# Patient Record
Sex: FEMALE | Race: WHITE | Employment: OTHER | URBAN - NONMETROPOLITAN AREA
[De-identification: names, ages, dates, MRNs, and addresses within clinical notes are randomized per-mention and may not be internally consistent; named-entity substitution may affect disease eponyms.]

---

## 2022-03-07 ENCOUNTER — OFFICE VISIT (OUTPATIENT)
Dept: OTOLARYNGOLOGY | Age: 87
End: 2022-03-07
Payer: MEDICARE

## 2022-03-07 VITALS
HEART RATE: 68 BPM | HEIGHT: 64 IN | WEIGHT: 136.2 LBS | SYSTOLIC BLOOD PRESSURE: 118 MMHG | OXYGEN SATURATION: 99 % | BODY MASS INDEX: 23.25 KG/M2 | DIASTOLIC BLOOD PRESSURE: 76 MMHG | RESPIRATION RATE: 16 BRPM

## 2022-03-07 DIAGNOSIS — R04.0 EPISTAXIS: Primary | ICD-10-CM

## 2022-03-07 PROCEDURE — 1090F PRES/ABSN URINE INCON ASSESS: CPT | Performed by: OTOLARYNGOLOGY

## 2022-03-07 PROCEDURE — 99214 OFFICE O/P EST MOD 30 MIN: CPT | Performed by: OTOLARYNGOLOGY

## 2022-03-07 PROCEDURE — 1123F ACP DISCUSS/DSCN MKR DOCD: CPT | Performed by: OTOLARYNGOLOGY

## 2022-03-07 PROCEDURE — G8484 FLU IMMUNIZE NO ADMIN: HCPCS | Performed by: OTOLARYNGOLOGY

## 2022-03-07 PROCEDURE — G8427 DOCREV CUR MEDS BY ELIG CLIN: HCPCS | Performed by: OTOLARYNGOLOGY

## 2022-03-07 PROCEDURE — G8420 CALC BMI NORM PARAMETERS: HCPCS | Performed by: OTOLARYNGOLOGY

## 2022-03-07 PROCEDURE — 1036F TOBACCO NON-USER: CPT | Performed by: OTOLARYNGOLOGY

## 2022-03-07 PROCEDURE — 99203 OFFICE O/P NEW LOW 30 MIN: CPT | Performed by: OTOLARYNGOLOGY

## 2022-03-07 PROCEDURE — 4040F PNEUMOC VAC/ADMIN/RCVD: CPT | Performed by: OTOLARYNGOLOGY

## 2022-03-07 RX ORDER — MECLIZINE HYDROCHLORIDE 25 MG/1
TABLET ORAL
COMMUNITY

## 2022-03-07 RX ORDER — CLOTRIMAZOLE AND BETAMETHASONE DIPROPIONATE 10; .64 MG/G; MG/G
CREAM TOPICAL
COMMUNITY

## 2022-03-07 RX ORDER — ASPIRIN 81 MG/1
TABLET ORAL
COMMUNITY

## 2022-03-07 RX ORDER — PRAVASTATIN SODIUM 40 MG
TABLET ORAL
COMMUNITY

## 2022-03-07 RX ORDER — BACLOFEN 10 MG/1
TABLET ORAL
COMMUNITY
Start: 2022-01-10

## 2022-03-07 RX ORDER — CLOPIDOGREL BISULFATE 75 MG/1
TABLET ORAL
COMMUNITY
Start: 2022-02-10

## 2022-03-07 RX ORDER — PANTOPRAZOLE SODIUM 40 MG/1
40 TABLET, DELAYED RELEASE ORAL DAILY
COMMUNITY

## 2022-03-07 RX ORDER — FLUOROMETHOLONE 0.1 %
SUSPENSION, DROPS(FINAL DOSAGE FORM)(ML) OPHTHALMIC (EYE)
COMMUNITY
Start: 2021-06-07

## 2022-03-07 RX ORDER — ALBUTEROL SULFATE 90 UG/1
AEROSOL, METERED RESPIRATORY (INHALATION)
COMMUNITY

## 2022-03-07 RX ORDER — FLUTICASONE PROPIONATE 250 UG/1
POWDER, METERED RESPIRATORY (INHALATION)
COMMUNITY

## 2022-03-07 RX ORDER — FAMOTIDINE 20 MG/1
TABLET, FILM COATED ORAL
COMMUNITY
Start: 2022-02-10

## 2022-03-07 RX ORDER — CELECOXIB 200 MG/1
CAPSULE ORAL
COMMUNITY

## 2022-03-07 RX ORDER — CLOBETASOL PROPIONATE 0.5 MG/G
CREAM TOPICAL
COMMUNITY
Start: 2022-02-07

## 2022-03-07 RX ORDER — ACETAMINOPHEN 500 MG
TABLET ORAL
COMMUNITY
Start: 2022-02-07

## 2022-03-07 NOTE — PROGRESS NOTES
4909 2:56 PM CINDY Freedman (:  1935) is a 80 y.o. female,New patient, here for evaluation of the following chief complaint(s):  Epistaxis (nw- remove rhinorocket from right)      ASSESSMENT/PLAN:  1. Epistaxis      1. Epistaxis    Discussed follow-up in a few weeks versus as needed. She has elected for as needed follow-up. Counseling for epistaxis prevention:  Ointment/emollient (Bacitracin or non-petroleum based ointment preferably) to the nostrils every pm   Nasal saline spray to the nose every am (2 sprays in both nostrils)  Cool humidifier in the bedroom   Drinks lots of water   Avoid trauma to the nose   Avoid nose blowing x 7 days   Avoid direct breeze of a fan onto the nose  No vigorous activity for 7 days that includes anything that gets the heart rate or blood pressure up. No bending over, straining, or lifting anything more than a few pounds for 7 days. Stop any medicine nasal sprays (Flonase, Nasonex, Nasacort, Rhinocort, Astelin, Atrovent)  If you get another nose bleed, spray afrin onto a cotton ball and put into the nostril as temporary packing. Do not use afrin for more than 3 days. No follow-ups on file. SUBJECTIVE/OBJECTIVE:  HPI   49-year-old woman who presented to the emergency room at [facility] on 2022. She complained of a nosebleed from the right side. She denied any inciting event such as trauma, scratching, or picking. She is on aspirin and Plavix. Her blood pressure was mildly elevated in triage but states her history is well controlled. 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 After 30 minutes, there was no recurrence of bleeding. Her hemoglobin and hematocrit were normal.  Her INR was 1.0. She was discharged home she returned to the emergency room on 2022 for another right-sided nosebleed that would not seem to stop with pressure. 7.5 cm Rhino Rocket was placed.   She presents today for removal.  Has not had any further epistaxis since placement of the [employer]. Her aspirin and Plavix are still currently on hold via the primary provider. History reviewed. No pertinent past medical history. History reviewed. No pertinent surgical history. Social History     Tobacco History     Smoking Status  Never Smoker    Smokeless Tobacco Use  Never Used          Alcohol History     Alcohol Use Status  Not Asked          Drug Use     Drug Use Status  Never          Sexual Activity     Sexually Active  Not Asked              Family History   Problem Relation Age of Onset    No Known Problems Mother     No Known Problems Father      Current Outpatient Medications   Medication Instructions    acetaminophen (TYLENOL) 500 MG tablet Acetaminophen (Tylenol Extra Strength) 500 mg tablet Active 500 MG PO Q6H February 7th, 2022 1:15pm    albuterol sulfate HFA (PROAIR HFA) 108 (90 Base) MCG/ACT inhaler ProAir HFA 90 mcg/actuation aerosol inhaler    aspirin 81 MG EC tablet aspirin 81 mg tablet,delayed release   Take 1 tablet every day by oral route.  baclofen (LIORESAL) 10 MG tablet No dose, route, or frequency recorded.  celecoxib (CELEBREX) 200 MG capsule celecoxib 200 mg capsule    clobetasol prop emollient base 0.05 % CREA Clobetasol Active 1 APPLICATIO TP DAILY 15 28 February 7th, 2022 1:53pm Apply pea sized amount topically before bedtime, daily for four weeks.  clopidogrel (PLAVIX) 75 MG tablet No dose, route, or frequency recorded.  clotrimazole-betamethasone (LOTRISONE) 1-0.05 % cream clotrimazole-betamethasone 1 %-0.05 % topical cream    famotidine (PEPCID) 20 MG tablet No dose, route, or frequency recorded.     fluorometholone (FML) 0.1 % ophthalmic suspension fluorometholone 0.1 % eye drops,suspension    fluticasone propionate (FLOVENT DISKUS) 250 MCG/BLIST AEPB inhaler Flovent Diskus 250 mcg/actuation powder for inhalation    meclizine (ANTIVERT) 25 MG tablet meclizine 25 mg tablet    pantoprazole (PROTONIX) 40 mg, Oral, DAILY    pravastatin (PRAVACHOL) 40 MG tablet pravastatin 40 mg tablet   Take 1 tablet every day by oral route. Allergies   Allergen Reactions    Nsaids Anaphylaxis and Other (See Comments)     zomepirac or Zomax - specifically  zomepirac or Zomax - specifically      Minocycline Dizziness or Vertigo     dizziness  dizziness      Azithromycin Nausea Only    Codeine Nausea Only and Nausea And Vomiting     Allegy-Codeine Derivatives       Fentanyl Nausea Only and Nausea And Vomiting       ENT ROS: positive for - epistaxis    General: The patient is found to be alert and normally responsive female. Hearing: grossly normal   Voice: Clear   Skin: The skin has normal colour and turgor. Face: The facial contour is symmetric at rest and with movement. Ears: The pinnae have normal contours. Eye: The ocular movements are full and symmetric, the conjunctiva is unremarkable; sclera are anicteric, pupillary response is symmetric. No nystagmus is found. Nose:   The external nasal contour is normal  The nasal mucosa has a normal appearance. The nasal septum is straight. The turbinates have a normal appearance  The nares are patent without evidence of polyposis   Rhino Rocket in place and secured in the right nasal cavity  Rhino Rocket deflated, no bleeding anteriorly or posteriorly  Rhino Rocket gently removed, no bleeding anterior posteriorly  Oral cavity:   The dentition is healthy. The oral mucosa is without lesions;  the tongue is symmetric with full mobility and is without fasciculation. The soft palate is symmetric. The oropharynx is unremarkable. No bleeding  Neck: The neck has a normal contour; no masses are found on palpation        An electronic signature was used to authenticate this note.     --Aysha Rothman MD     3/7/2022 3:17 PM EST

## 2022-03-11 ENCOUNTER — TELEPHONE (OUTPATIENT)
Dept: OTOLARYNGOLOGY | Age: 87
End: 2022-03-11

## 2022-03-11 NOTE — TELEPHONE ENCOUNTER
Appointment given for Tuesday 3-. I also gave her advice to use saline, a humidifier and keep afrin and cottonballs o hand in case she were to develop a significant nosebleed.

## 2022-03-11 NOTE — TELEPHONE ENCOUNTER
Patient was seen on 3/7/2022 and had a rhino rocket removed. She does not have a follow up but states this morning when she dabs her nose with a kleenex there is bright red blood. Patient is concerned because this is how her nose bleed started before and she does not want to go through having the rhino rocket again. Please call her back at [phone].

## 2022-03-15 ENCOUNTER — OFFICE VISIT (OUTPATIENT)
Dept: OTOLARYNGOLOGY | Age: 87
End: 2022-03-15
Payer: MEDICARE

## 2022-03-15 VITALS
RESPIRATION RATE: 18 BRPM | HEART RATE: 66 BPM | SYSTOLIC BLOOD PRESSURE: 122 MMHG | WEIGHT: 136.2 LBS | DIASTOLIC BLOOD PRESSURE: 80 MMHG | BODY MASS INDEX: 23.25 KG/M2 | HEIGHT: 64 IN

## 2022-03-15 DIAGNOSIS — J34.2 DEVIATED SEPTUM: ICD-10-CM

## 2022-03-15 DIAGNOSIS — R04.0 EPISTAXIS: ICD-10-CM

## 2022-03-15 DIAGNOSIS — J38.00 VOCAL CORD PARALYSIS: Primary | ICD-10-CM

## 2022-03-15 PROCEDURE — G8427 DOCREV CUR MEDS BY ELIG CLIN: HCPCS | Performed by: OTOLARYNGOLOGY

## 2022-03-15 PROCEDURE — G8484 FLU IMMUNIZE NO ADMIN: HCPCS | Performed by: OTOLARYNGOLOGY

## 2022-03-15 PROCEDURE — 1123F ACP DISCUSS/DSCN MKR DOCD: CPT | Performed by: OTOLARYNGOLOGY

## 2022-03-15 PROCEDURE — 99214 OFFICE O/P EST MOD 30 MIN: CPT | Performed by: OTOLARYNGOLOGY

## 2022-03-15 PROCEDURE — 99212 OFFICE O/P EST SF 10 MIN: CPT | Performed by: OTOLARYNGOLOGY

## 2022-03-15 PROCEDURE — G8420 CALC BMI NORM PARAMETERS: HCPCS | Performed by: OTOLARYNGOLOGY

## 2022-03-15 PROCEDURE — 1036F TOBACCO NON-USER: CPT | Performed by: OTOLARYNGOLOGY

## 2022-03-15 PROCEDURE — 1090F PRES/ABSN URINE INCON ASSESS: CPT | Performed by: OTOLARYNGOLOGY

## 2022-03-15 PROCEDURE — 31231 NASAL ENDOSCOPY DX: CPT | Performed by: OTOLARYNGOLOGY

## 2022-03-15 PROCEDURE — 31575 DIAGNOSTIC LARYNGOSCOPY: CPT | Performed by: OTOLARYNGOLOGY

## 2022-03-15 PROCEDURE — 4040F PNEUMOC VAC/ADMIN/RCVD: CPT | Performed by: OTOLARYNGOLOGY

## 2022-03-15 RX ORDER — ADHESIVE TAPE 3"X 2.3 YD
400 TAPE, NON-MEDICATED TOPICAL 2 TIMES DAILY
COMMUNITY

## 2022-03-15 NOTE — PROGRESS NOTES
2:01 PM CINDY Lucas (:  1935) is a 80 y.o. female,New patient, here for evaluation of the following chief complaint(s):  Epistaxis (right side has been oozing)      ASSESSMENT/PLAN:  1. Vocal cord paralysis    2. Deviated septum    3. Epistaxis      1. Vocal cord paralysis  2. Deviated septum  3. Epistaxis    Fu in 2 weeks     Counseling for epistaxis prevention:  Ointment/emollient (Bacitracin or non-petroleum based ointment preferably) to the nostrils every pm   Nasal saline spray to the nose every am (2 sprays in both nostrils)  Cool humidifier in the bedroom   Drinks lots of water   Avoid trauma to the nose   Avoid nose blowing x 7 days   Avoid direct breeze of a fan onto the nose  No vigorous activity for 7 days that includes anything that gets the heart rate or blood pressure up. No bending over, straining, or lifting anything more than a few pounds for 7 days. Stop any medicine nasal sprays (Flonase, Nasonex, Nasacort, Rhinocort, Astelin, Atrovent)  If you get another nose bleed, spray afrin onto a cotton ball and put into the nostril as temporary packing. Do not use afrin for more than 3 days. No follow-ups on file. SUBJECTIVE/OBJECTIVE:  HPI   80-year-old woman who presented to the emergency room at [facility] on 2022. She complained of a nosebleed from the right side. She denied any inciting event such as trauma, scratching, or picking. She is on aspirin and Plavix. Her blood pressure was mildly elevated in triage but states her history is well controlled. 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 After 30 minutes, there was no recurrence of bleeding. Her hemoglobin and hematocrit were normal.  Her INR was 1.0. She was discharged home she returned to the emergency room on 2022 for another right-sided nosebleed that would not seem to stop with pressure. 7.5 cm Rhino Rocket was placed.   She presents today for removal.  Has not had any further epistaxis since placement of the [employer]. Her aspirin and Plavix are still currently on hold via the primary provider. She follows up about 1 week later. Is having some anterior bleeding from the right nose. Not as bad as original epistaxis on 2/23. Recently saw vascular surgeon. She has stopped the plavix and is on ASA only right now. 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 Stable. Bothered by vocal changes. Denies aspiration. Has worked with speech therapy in the past without much subjective improvement. She asks if we can check her vocal cord. No past medical history on file. No past surgical history on file. Social History     Tobacco History     Smoking Status  Never Smoker    Smokeless Tobacco Use  Never Used          Alcohol History     Alcohol Use Status  Not Asked          Drug Use     Drug Use Status  Never          Sexual Activity     Sexually Active  Not Asked              Family History   Problem Relation Age of Onset    No Known Problems Mother     No Known Problems Father      Current Outpatient Medications   Medication Instructions    acetaminophen (TYLENOL) 500 MG tablet Acetaminophen (Tylenol Extra Strength) 500 mg tablet Active 500 MG PO Q6H February 7th, 2022 1:15pm    albuterol sulfate HFA (PROAIR HFA) 108 (90 Base) MCG/ACT inhaler ProAir HFA 90 mcg/actuation aerosol inhaler    aspirin 81 MG EC tablet aspirin 81 mg tablet,delayed release   Take 1 tablet every day by oral route.  baclofen (LIORESAL) 10 MG tablet No dose, route, or frequency recorded.  celecoxib (CELEBREX) 200 MG capsule celecoxib 200 mg capsule    clobetasol prop emollient base 0.05 % CREA Clobetasol Active 1 APPLICATIO TP DAILY 15 28 February 7th, 2022 1:53pm Apply pea sized amount topically before bedtime, daily for four weeks.  clopidogrel (PLAVIX) 75 MG tablet No dose, route, or frequency recorded.     clotrimazole-betamethasone (LOTRISONE) 1-0.05 % cream clotrimazole-betamethasone 1 %-0.05 % topical cream    famotidine (PEPCID) 20 MG tablet No dose, route, or frequency recorded.  fluorometholone (FML) 0.1 % ophthalmic suspension fluorometholone 0.1 % eye drops,suspension    fluticasone propionate (FLOVENT DISKUS) 250 MCG/BLIST AEPB inhaler Flovent Diskus 250 mcg/actuation powder for inhalation    Magnesium Oxide 400 mg, Oral, 2 TIMES DAILY    meclizine (ANTIVERT) 25 MG tablet meclizine 25 mg tablet    pantoprazole (PROTONIX) 40 mg, Oral, DAILY    pravastatin (PRAVACHOL) 40 MG tablet pravastatin 40 mg tablet   Take 1 tablet every day by oral route. Allergies   Allergen Reactions    Nsaids Anaphylaxis and Other (See Comments)     zomepirac or Zomax - specifically  zomepirac or Zomax - specifically      Minocycline Dizziness or Vertigo     dizziness  dizziness      Azithromycin Nausea Only    Codeine Nausea Only and Nausea And Vomiting     Allegy-Codeine Derivatives       Fentanyl Nausea Only and Nausea And Vomiting       ENT ROS: positive for - epistaxis    General: The patient is found to be alert and normally responsive female. Hearing: grossly normal   Voice: Clear   Skin: The skin has normal colour and turgor. Face: The facial contour is symmetric at rest and with movement. Ears: The pinnae have normal contours. Eye: The ocular movements are full and symmetric, the conjunctiva is unremarkable; sclera are anicteric, pupillary response is symmetric. No nystagmus is found. Nose:   The external nasal contour is normal  The nasal mucosa is excoriated at the right anterior septum, evidence of recent epistaxis    The nasal septum is deviated to the right. The turbinates have a normal appearance  The nares are patent without evidence of polyposis   Oral cavity:   The dentition is healthy. The oral mucosa is without lesions;  the tongue is symmetric with full mobility and is without fasciculation.   The soft palate is symmetric. The oropharynx is unremarkable. No bleeding  Neck: The neck has a normal contour; no masses are found on palpation    Fiberoptic examination of the upper airway using scope was conducted after first applying topical phenylephrine (1%) and lidocaine (4%) to the bilateral nasal cavity by spray. The endoscope was inserted and advanced through the bilateral side of the nose examining the mucosa and nasal structures. Right:  Inferior turbinate normal, middle meatus clear, no polyps or purulence  Left:  Inferior turbinate normal, middle meatus clear, no polyps or purulence  Nasopharynx clear, ET patent, adenoid small. Septum to the right. Fiberoptic examination of the upper airway using scope was conducted after first applying topical phenylephrine (1%) and lidocaine (4%) to the bilateral nasal cavity by spray. The endoscope was inserted and advanced through the bilateral side of the nose examining the mucosa and nasal structures. Advancement through the nasopharynx was continued into the hypopharynx and larynx. The tongue base, vallecula, pharyngeal walls, epiglottis aryepiglottic folds arytenoids, vocal cords, piriform sinuses and proximal trachea were examined. Findings include left medialized, immobile vocal cord, no pooled secretions. Mobility of the structures was symmetric and full. An electronic signature was used to authenticate this note.     --Abrahan English MD     3/15/2022 3:17 PM EST

## 2022-04-01 ENCOUNTER — OFFICE VISIT (OUTPATIENT)
Dept: OTOLARYNGOLOGY | Age: 87
End: 2022-04-01
Payer: MEDICARE

## 2022-04-01 VITALS
HEIGHT: 66 IN | BODY MASS INDEX: 21.89 KG/M2 | DIASTOLIC BLOOD PRESSURE: 78 MMHG | WEIGHT: 136.2 LBS | OXYGEN SATURATION: 97 % | HEART RATE: 77 BPM | RESPIRATION RATE: 16 BRPM | SYSTOLIC BLOOD PRESSURE: 114 MMHG

## 2022-04-01 DIAGNOSIS — R04.0 EPISTAXIS: ICD-10-CM

## 2022-04-01 DIAGNOSIS — J34.2 DEVIATED SEPTUM: Primary | ICD-10-CM

## 2022-04-01 PROCEDURE — 1090F PRES/ABSN URINE INCON ASSESS: CPT | Performed by: OTOLARYNGOLOGY

## 2022-04-01 PROCEDURE — G8420 CALC BMI NORM PARAMETERS: HCPCS | Performed by: OTOLARYNGOLOGY

## 2022-04-01 PROCEDURE — 30901 CONTROL OF NOSEBLEED: CPT | Performed by: OTOLARYNGOLOGY

## 2022-04-01 PROCEDURE — G8427 DOCREV CUR MEDS BY ELIG CLIN: HCPCS | Performed by: OTOLARYNGOLOGY

## 2022-04-01 PROCEDURE — 1123F ACP DISCUSS/DSCN MKR DOCD: CPT | Performed by: OTOLARYNGOLOGY

## 2022-04-01 PROCEDURE — 99214 OFFICE O/P EST MOD 30 MIN: CPT | Performed by: OTOLARYNGOLOGY

## 2022-04-01 PROCEDURE — 4040F PNEUMOC VAC/ADMIN/RCVD: CPT | Performed by: OTOLARYNGOLOGY

## 2022-04-01 PROCEDURE — 99213 OFFICE O/P EST LOW 20 MIN: CPT | Performed by: OTOLARYNGOLOGY

## 2022-04-01 PROCEDURE — 1036F TOBACCO NON-USER: CPT | Performed by: OTOLARYNGOLOGY

## 2022-04-01 NOTE — PROGRESS NOTES
2151 1:57 PM ICNDY Artis (:  1935) is a 80 y.o. female,New patient, here for evaluation of the following chief complaint(s):  Epistaxis (2 wk fucaut right)      ASSESSMENT/PLAN:  1. Deviated septum    2. Epistaxis      1. Deviated septum  2. Epistaxis    Fu in 2 weeks     Counseling for epistaxis prevention:  Ointment/emollient (Bacitracin or non-petroleum based ointment preferably) to the nostrils every pm   Nasal saline spray to the nose every am (2 sprays in both nostrils)  Cool humidifier in the bedroom   Drinks lots of water   Avoid trauma to the nose   Avoid nose blowing x 7 days   Avoid direct breeze of a fan onto the nose  No vigorous activity for 7 days that includes anything that gets the heart rate or blood pressure up. No bending over, straining, or lifting anything more than a few pounds for 7 days. Stop any medicine nasal sprays (Flonase, Nasonex, Nasacort, Rhinocort, Astelin, Atrovent)  If you get another nose bleed, spray afrin onto a cotton ball and put into the nostril as temporary packing. Do not use afrin for more than 3 days. No follow-ups on file. SUBJECTIVE/OBJECTIVE:  Epistaxis     77-year-old woman who presented to the emergency room at [facility] on 2022. She complained of a nosebleed from the right side. She denied any inciting event such as trauma, scratching, or picking. She is on aspirin and Plavix. Her blood pressure was mildly elevated in triage but states her history is well controlled. 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 After 30 minutes, there was no recurrence of bleeding. Her hemoglobin and hematocrit were normal.  Her INR was 1.0. She was discharged home she returned to the emergency room on 2022 for another right-sided nosebleed that would not seem to stop with pressure. 7.5 cm Rhino Rocket was placed.   She presented for removal.  Has not had any further epistaxis since placement of the Rhino idania. Her aspirin and Plavix are still currently on hold via the primary provider. She follows up about 1 week later. Is having some anterior bleeding from the right nose. Not as bad as original epistaxis on 2/23. Recently saw vascular surgeon. She has stopped the plavix and is on ASA only right now. We did cautery on the right septum 3/15/22. 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 Stable. Bothered by vocal changes. Denies aspiration. Has worked with speech therapy in the past without much subjective improvement. She asks if we can check her vocal cord. History reviewed. No pertinent past medical history. History reviewed. No pertinent surgical history. Social History     Tobacco History     Smoking Status  Never Smoker    Smokeless Tobacco Use  Never Used          Alcohol History     Alcohol Use Status  Not Asked          Drug Use     Drug Use Status  Never          Sexual Activity     Sexually Active  Not Asked              Family History   Problem Relation Age of Onset    No Known Problems Mother     No Known Problems Father      Current Outpatient Medications   Medication Instructions    acetaminophen (TYLENOL) 500 MG tablet Acetaminophen (Tylenol Extra Strength) 500 mg tablet Active 500 MG PO Q6H February 7th, 2022 1:15pm    albuterol sulfate HFA (PROAIR HFA) 108 (90 Base) MCG/ACT inhaler ProAir HFA 90 mcg/actuation aerosol inhaler    aspirin 81 MG EC tablet aspirin 81 mg tablet,delayed release   Take 1 tablet every day by oral route.  baclofen (LIORESAL) 10 MG tablet No dose, route, or frequency recorded.  celecoxib (CELEBREX) 200 MG capsule celecoxib 200 mg capsule    clobetasol prop emollient base 0.05 % CREA Clobetasol Active 1 APPLICATIO TP DAILY 15 28 February 7th, 2022 1:53pm Apply pea sized amount topically before bedtime, daily for four weeks.  clopidogrel (PLAVIX) 75 MG tablet No dose, route, or frequency recorded.     clotrimazole-betamethasone (LOTRISONE) 1-0.05 % cream clotrimazole-betamethasone 1 %-0.05 % topical cream    famotidine (PEPCID) 20 MG tablet No dose, route, or frequency recorded.  fluorometholone (FML) 0.1 % ophthalmic suspension fluorometholone 0.1 % eye drops,suspension    fluticasone propionate (FLOVENT DISKUS) 250 MCG/BLIST AEPB inhaler Flovent Diskus 250 mcg/actuation powder for inhalation    Magnesium Oxide 400 mg, Oral, 2 TIMES DAILY    meclizine (ANTIVERT) 25 MG tablet meclizine 25 mg tablet    pantoprazole (PROTONIX) 40 mg, Oral, DAILY    pravastatin (PRAVACHOL) 40 MG tablet pravastatin 40 mg tablet   Take 1 tablet every day by oral route. Allergies   Allergen Reactions    Nsaids Anaphylaxis and Other (See Comments)     zomepirac or Zomax - specifically  zomepirac or Zomax - specifically      Minocycline Dizziness or Vertigo     dizziness  dizziness      Azithromycin Nausea Only    Codeine Nausea Only and Nausea And Vomiting     Allegy-Codeine Derivatives       Fentanyl Nausea Only and Nausea And Vomiting       ENT ROS: positive for - epistaxis    General: The patient is found to be alert and normally responsive female. Hearing: grossly normal   Voice: Clear   Skin: The skin has normal colour and turgor. Face: The facial contour is symmetric at rest and with movement. Ears: The pinnae have normal contours. Eye: The ocular movements are full and symmetric, the conjunctiva is unremarkable; sclera are anicteric, pupillary response is symmetric. No nystagmus is found. Nose:   The external nasal contour is normal  The nasal mucosa is excoriated at the right anterior septum, evidence of recent epistaxis, area of scabbing treated with h202 and gently suctioned with fresh bleeding. Controlled with afrin pressure and treated with silver nitrate. The nasal septum is deviated to the right.     The turbinates have a normal appearance  The nares are patent without evidence of polyposis Oral cavity:   The dentition is healthy. The oral mucosa is without lesions;  the tongue is symmetric with full mobility and is without fasciculation. The soft palate is symmetric. The oropharynx is unremarkable. No bleeding  Neck: The neck has a normal contour; no masses are found on palpation    Previous:   Fiberoptic examination of the upper airway using scope was conducted after first applying topical phenylephrine (1%) and lidocaine (4%) to the bilateral nasal cavity by spray. The endoscope was inserted and advanced through the bilateral side of the nose examining the mucosa and nasal structures. Right:  Inferior turbinate normal, middle meatus clear, no polyps or purulence  Left:  Inferior turbinate normal, middle meatus clear, no polyps or purulence  Nasopharynx clear, ET patent, adenoid small. Septum to the right. Previous:   Fiberoptic examination of the upper airway using scope was conducted after first applying topical phenylephrine (1%) and lidocaine (4%) to the bilateral nasal cavity by spray. The endoscope was inserted and advanced through the bilateral side of the nose examining the mucosa and nasal structures. Advancement through the nasopharynx was continued into the hypopharynx and larynx. The tongue base, vallecula, pharyngeal walls, epiglottis aryepiglottic folds arytenoids, vocal cords, piriform sinuses and proximal trachea were examined. Findings include left medialized, immobile vocal cord, no pooled secretions. Mobility of the structures was symmetric and full. CONTROL OF EPISTAXIS  Afrin soaked cotton ball placed on the right anterior septum. Prominent vessel touched with silver nitrate on the right anterior septum. No further bleeding. Patient tolerated well. Ointment placed into right nasal cavity. An electronic signature was used to authenticate this note.     --Sangeeta Carlson MD     4/1/2022 3:17 PM EST

## 2022-04-15 ENCOUNTER — OFFICE VISIT (OUTPATIENT)
Dept: OTOLARYNGOLOGY | Age: 87
End: 2022-04-15
Payer: MEDICARE

## 2022-04-15 VITALS
DIASTOLIC BLOOD PRESSURE: 74 MMHG | OXYGEN SATURATION: 99 % | WEIGHT: 136.2 LBS | BODY MASS INDEX: 24.13 KG/M2 | SYSTOLIC BLOOD PRESSURE: 124 MMHG | RESPIRATION RATE: 18 BRPM | HEIGHT: 63 IN | HEART RATE: 80 BPM

## 2022-04-15 DIAGNOSIS — J34.2 DEVIATED SEPTUM: Primary | ICD-10-CM

## 2022-04-15 DIAGNOSIS — R04.0 EPISTAXIS: ICD-10-CM

## 2022-04-15 PROCEDURE — 30901 CONTROL OF NOSEBLEED: CPT | Performed by: OTOLARYNGOLOGY

## 2022-04-15 PROCEDURE — 1123F ACP DISCUSS/DSCN MKR DOCD: CPT | Performed by: OTOLARYNGOLOGY

## 2022-04-15 PROCEDURE — 4040F PNEUMOC VAC/ADMIN/RCVD: CPT | Performed by: OTOLARYNGOLOGY

## 2022-04-15 PROCEDURE — G8420 CALC BMI NORM PARAMETERS: HCPCS | Performed by: OTOLARYNGOLOGY

## 2022-04-15 PROCEDURE — G8427 DOCREV CUR MEDS BY ELIG CLIN: HCPCS | Performed by: OTOLARYNGOLOGY

## 2022-04-15 PROCEDURE — 1036F TOBACCO NON-USER: CPT | Performed by: OTOLARYNGOLOGY

## 2022-04-15 PROCEDURE — 99214 OFFICE O/P EST MOD 30 MIN: CPT | Performed by: OTOLARYNGOLOGY

## 2022-04-15 PROCEDURE — 1090F PRES/ABSN URINE INCON ASSESS: CPT | Performed by: OTOLARYNGOLOGY

## 2022-04-15 NOTE — PROGRESS NOTES
8844 9:62 PM CINDY Khanna (:  1935) is a 80 y.o. female,New patient, here for evaluation of the following chief complaint(s):  Epistaxis (2 wk fu caut right)      ASSESSMENT/PLAN:  1. Deviated septum    2. Epistaxis      1. Deviated septum  2. Epistaxis    Fu in 2 weeks   Avoid any powered or pressured saline sprays into the right nose. She is preventing the area from healing well by continually irritating the area with the powered sprayer. Recommend either a very gentle saline spray or nothing at all. Hyfrecator treatment to the right if she continues to have epistaxis. Uphill bledsoe with anticoagulation and severe deviated septum  Continue humidifier in the bedroom    No follow-ups on file. SUBJECTIVE/OBJECTIVE:  Epistaxis     19-year-old woman who presented to the emergency room at [facility] on 2022. She complained of a nosebleed from the right side. She denied any inciting event such as trauma, scratching, or picking. She is on aspirin and Plavix. Her blood pressure was mildly elevated in triage but states her history is well controlled. 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 After 30 minutes, there was no recurrence of bleeding. Her hemoglobin and hematocrit were normal.  Her INR was 1.0. She was discharged home she returned to the emergency room on 2022 for another right-sided nosebleed that would not seem to stop with pressure. 7.5 cm Rhino Rocket was placed. She presented for removal.  Has not had any further epistaxis since placement of the [employer]. Her aspirin and Plavix are still currently on hold via the primary provider. She follows up about 1 week later. Is having some anterior bleeding from the right nose. Not as bad as original epistaxis on . Recently saw vascular surgeon. She has stopped the plavix and is on ASA only right now. We did cautery on the right septum 3/15/22.   We did a second cautery on the right septum on 4/1/2022. She reports using a powered or aerosolized saline spray in the morning time and knocking off the scab. Following this the area seems to ooze. 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 Stable. Bothered by vocal changes. Denies aspiration. Has worked with speech therapy in the past without much subjective improvement. She asks if we can check her vocal cord. History reviewed. No pertinent past medical history. History reviewed. No pertinent surgical history. Social History     Tobacco History     Smoking Status  Never Smoker    Smokeless Tobacco Use  Never Used          Alcohol History     Alcohol Use Status  Not Asked          Drug Use     Drug Use Status  Never          Sexual Activity     Sexually Active  Not Asked              Family History   Problem Relation Age of Onset    No Known Problems Mother     No Known Problems Father      Current Outpatient Medications   Medication Instructions    acetaminophen (TYLENOL) 500 MG tablet Acetaminophen (Tylenol Extra Strength) 500 mg tablet Active 500 MG PO Q6H February 7th, 2022 1:15pm    albuterol sulfate HFA (PROAIR HFA) 108 (90 Base) MCG/ACT inhaler ProAir HFA 90 mcg/actuation aerosol inhaler    aspirin 81 MG EC tablet aspirin 81 mg tablet,delayed release   Take 1 tablet every day by oral route.  baclofen (LIORESAL) 10 MG tablet No dose, route, or frequency recorded.  celecoxib (CELEBREX) 200 MG capsule celecoxib 200 mg capsule    clobetasol prop emollient base 0.05 % CREA Clobetasol Active 1 APPLICATIO TP DAILY 15 28 February 7th, 2022 1:53pm Apply pea sized amount topically before bedtime, daily for four weeks.  clopidogrel (PLAVIX) 75 MG tablet No dose, route, or frequency recorded.  clotrimazole-betamethasone (LOTRISONE) 1-0.05 % cream clotrimazole-betamethasone 1 %-0.05 % topical cream    famotidine (PEPCID) 20 MG tablet No dose, route, or frequency recorded.     fluorometholone (FML) 0.1 % ophthalmic suspension fluorometholone 0.1 % eye drops,suspension    fluticasone propionate (FLOVENT DISKUS) 250 MCG/BLIST AEPB inhaler Flovent Diskus 250 mcg/actuation powder for inhalation    Magnesium Oxide 400 mg, Oral, 2 TIMES DAILY    meclizine (ANTIVERT) 25 MG tablet meclizine 25 mg tablet    pantoprazole (PROTONIX) 40 mg, Oral, DAILY    pravastatin (PRAVACHOL) 40 MG tablet pravastatin 40 mg tablet   Take 1 tablet every day by oral route. Allergies   Allergen Reactions    Nsaids Anaphylaxis and Other (See Comments)     zomepirac or Zomax - specifically  zomepirac or Zomax - specifically      Minocycline Dizziness or Vertigo     dizziness  dizziness      Azithromycin Nausea Only    Codeine Nausea Only and Nausea And Vomiting     Allegy-Codeine Derivatives       Fentanyl Nausea Only and Nausea And Vomiting       ENT ROS: positive for - epistaxis    General: The patient is found to be alert and normally responsive female. Hearing: grossly normal   Voice: Clear   Skin: The skin has normal colour and turgor. Face: The facial contour is symmetric at rest and with movement. Ears: The pinnae have normal contours. Eye: The ocular movements are full and symmetric, the conjunctiva is unremarkable; sclera are anicteric, pupillary response is symmetric. No nystagmus is found. Nose:   The external nasal contour is normal  The nasal mucosa is excoriated at the right anterior septum, evidence of recent epistaxis  The nasal septum is deviated to the right. The turbinates have a normal appearance  The nares are patent without evidence of polyposis   Oral cavity:   The dentition is healthy. The oral mucosa is without lesions;  the tongue is symmetric with full mobility and is without fasciculation. The soft palate is symmetric. The oropharynx is unremarkable.   No bleeding  Neck: The neck has a normal contour; no masses are found on palpation    Previous:   Fiberoptic examination of the upper airway using scope was conducted after first applying topical phenylephrine (1%) and lidocaine (4%) to the bilateral nasal cavity by spray. The endoscope was inserted and advanced through the bilateral side of the nose examining the mucosa and nasal structures. Right:  Inferior turbinate normal, middle meatus clear, no polyps or purulence  Left:  Inferior turbinate normal, middle meatus clear, no polyps or purulence  Nasopharynx clear, ET patent, adenoid small. Septum to the right. Previous:   Fiberoptic examination of the upper airway using scope was conducted after first applying topical phenylephrine (1%) and lidocaine (4%) to the bilateral nasal cavity by spray. The endoscope was inserted and advanced through the bilateral side of the nose examining the mucosa and nasal structures. Advancement through the nasopharynx was continued into the hypopharynx and larynx. The tongue base, vallecula, pharyngeal walls, epiglottis aryepiglottic folds arytenoids, vocal cords, piriform sinuses and proximal trachea were examined. Findings include left medialized, immobile vocal cord, no pooled secretions. Mobility of the structures was symmetric and full. CONTROL OF EPISTAXIS  Afrin soaked cotton ball placed on the right anterior septum. Prominent vessel touched with silver nitrate on the right anterior septum. No further bleeding. Patient tolerated well. Ointment placed into right nasal cavity. An electronic signature was used to authenticate this note.     --Christen Jensen MD     4/15/2022 3:17 PM EST

## 2022-05-20 ENCOUNTER — OFFICE VISIT (OUTPATIENT)
Dept: OTOLARYNGOLOGY | Age: 87
End: 2022-05-20
Payer: MEDICARE

## 2022-05-20 VITALS
HEIGHT: 63 IN | OXYGEN SATURATION: 97 % | BODY MASS INDEX: 22.82 KG/M2 | DIASTOLIC BLOOD PRESSURE: 70 MMHG | WEIGHT: 128.8 LBS | RESPIRATION RATE: 16 BRPM | HEART RATE: 70 BPM | SYSTOLIC BLOOD PRESSURE: 118 MMHG

## 2022-05-20 DIAGNOSIS — R09.82 POSTNASAL DRIP: ICD-10-CM

## 2022-05-20 DIAGNOSIS — J30.2 SEASONAL ALLERGIC RHINITIS, UNSPECIFIED TRIGGER: ICD-10-CM

## 2022-05-20 DIAGNOSIS — R04.0 EPISTAXIS: Primary | ICD-10-CM

## 2022-05-20 DIAGNOSIS — J34.2 DEVIATED SEPTUM: ICD-10-CM

## 2022-05-20 PROCEDURE — 1036F TOBACCO NON-USER: CPT | Performed by: OTOLARYNGOLOGY

## 2022-05-20 PROCEDURE — 1123F ACP DISCUSS/DSCN MKR DOCD: CPT | Performed by: OTOLARYNGOLOGY

## 2022-05-20 PROCEDURE — 99212 OFFICE O/P EST SF 10 MIN: CPT | Performed by: OTOLARYNGOLOGY

## 2022-05-20 PROCEDURE — 1090F PRES/ABSN URINE INCON ASSESS: CPT | Performed by: OTOLARYNGOLOGY

## 2022-05-20 PROCEDURE — G8420 CALC BMI NORM PARAMETERS: HCPCS | Performed by: OTOLARYNGOLOGY

## 2022-05-20 PROCEDURE — 4040F PNEUMOC VAC/ADMIN/RCVD: CPT | Performed by: OTOLARYNGOLOGY

## 2022-05-20 PROCEDURE — G8427 DOCREV CUR MEDS BY ELIG CLIN: HCPCS | Performed by: OTOLARYNGOLOGY

## 2022-05-20 PROCEDURE — 99213 OFFICE O/P EST LOW 20 MIN: CPT | Performed by: OTOLARYNGOLOGY

## 2022-05-20 NOTE — PROGRESS NOTES
9:00 PM CINDY Jerez (:  1935) is a 80 y.o. female,New patient, here for evaluation of the following chief complaint(s):  Epistaxis (follow up cauterization right)      ASSESSMENT/PLAN:  1. Epistaxis    2. Deviated septum    3. Postnasal drip    4. Seasonal allergic rhinitis, unspecified trigger      1. Epistaxis  2. Deviated septum  3. Postnasal drip  4. Seasonal allergic rhinitis, unspecified trigger    Claritin once a day  Continue saline sprays  Follow-up as needed    No follow-ups on file. SUBJECTIVE/OBJECTIVE:  Epistaxis     26-year-old woman who presented to the emergency room at [facility] on 2022. She complained of a nosebleed from the right side. She denied any inciting event such as trauma, scratching, or picking. She is on aspirin and Plavix. Her blood pressure was mildly elevated in triage but states her history is well controlled. 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 After 30 minutes, there was no recurrence of bleeding. Her hemoglobin and hematocrit were normal.  Her INR was 1.0. She was discharged home she returned to the emergency room on 2022 for another right-sided nosebleed that would not seem to stop with pressure. 7.5 cm Rhino Rocket was placed. She presented for removal.  Has not had any further epistaxis since placement of the [employer]. Her aspirin and Plavix are still currently on hold via the primary provider. She follows up about 1 week later. Is having some anterior bleeding from the right nose. Not as bad as original epistaxis on . Recently saw vascular surgeon. She has stopped the plavix and is on ASA only right now. We did cautery on the right septum 3/15/22. We did a second cautery on the right septum on 2022. She reports using a powered or aerosolized saline spray in the morning time and knocking off the scab. Following this the area seems to ooze.     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 Stable. Bothered by vocal changes. Denies aspiration. Has worked with speech therapy in the past without much subjective improvement. She asks if we can check her vocal cord. Today she follows up about 1 month later. No epistaxis. In the interim was diagnosed with COVID but has recovered well. He is continuing to do some saline. She normally has springtime/summer allergies and she is noticing worsening postnasal drip. She is wondering what she can do. She has since switched her anticoagulation to to 81 mg aspirin a day. History reviewed. No pertinent past medical history. History reviewed. No pertinent surgical history. Social History     Tobacco History     Smoking Status  Never Smoker    Smokeless Tobacco Use  Never Used          Alcohol History     Alcohol Use Status  Not Asked          Drug Use     Drug Use Status  Never          Sexual Activity     Sexually Active  Not Asked              Family History   Problem Relation Age of Onset    No Known Problems Mother     No Known Problems Father      Current Outpatient Medications   Medication Instructions    acetaminophen (TYLENOL) 500 MG tablet Acetaminophen (Tylenol Extra Strength) 500 mg tablet Active 500 MG PO Q6H February 7th, 2022 1:15pm    albuterol sulfate HFA (PROAIR HFA) 108 (90 Base) MCG/ACT inhaler ProAir HFA 90 mcg/actuation aerosol inhaler    aspirin 81 MG EC tablet aspirin 81 mg tablet,delayed release   Take 1 tablet every day by oral route.  baclofen (LIORESAL) 10 MG tablet No dose, route, or frequency recorded.  celecoxib (CELEBREX) 200 MG capsule celecoxib 200 mg capsule    clobetasol prop emollient base 0.05 % CREA Clobetasol Active 1 APPLICATIO TP DAILY 15 28 February 7th, 2022 1:53pm Apply pea sized amount topically before bedtime, daily for four weeks.  clopidogrel (PLAVIX) 75 MG tablet No dose, route, or frequency recorded.     clotrimazole-betamethasone (LOTRISONE) 1-0.05 % cream clotrimazole-betamethasone 1 %-0.05 % topical cream    famotidine (PEPCID) 20 MG tablet No dose, route, or frequency recorded.  fluorometholone (FML) 0.1 % ophthalmic suspension fluorometholone 0.1 % eye drops,suspension    fluticasone propionate (FLOVENT DISKUS) 250 MCG/BLIST AEPB inhaler Flovent Diskus 250 mcg/actuation powder for inhalation    Magnesium Oxide 400 mg, Oral, 2 TIMES DAILY    meclizine (ANTIVERT) 25 MG tablet meclizine 25 mg tablet    pantoprazole (PROTONIX) 40 mg, Oral, DAILY    pravastatin (PRAVACHOL) 40 MG tablet pravastatin 40 mg tablet   Take 1 tablet every day by oral route. Allergies   Allergen Reactions    Nsaids Anaphylaxis and Other (See Comments)     zomepirac or Zomax - specifically  zomepirac or Zomax - specifically      Minocycline Dizziness or Vertigo     dizziness  dizziness      Azithromycin Nausea Only    Codeine Nausea Only and Nausea And Vomiting     Allegy-Codeine Derivatives       Fentanyl Nausea Only and Nausea And Vomiting       ENT ROS: positive for - epistaxis    General: The patient is found to be alert and normally responsive female. Hearing: grossly normal   Voice: Clear   Skin: The skin has normal colour and turgor. Face: The facial contour is symmetric at rest and with movement. Ears: The pinnae have normal contours. Eye: The ocular movements are full and symmetric, the conjunctiva is unremarkable; sclera are anicteric, pupillary response is symmetric. No nystagmus is found. Nose:   The external nasal contour is normal  The nasal mucosa is well healed  The nasal septum is deviated to the right. The turbinates have a normal appearance  The nares are patent without evidence of polyposis   Oral cavity:   The dentition is healthy. The oral mucosa is without lesions;  the tongue is symmetric with full mobility and is without fasciculation. The soft palate is symmetric. The oropharynx is unremarkable.   No bleeding  Neck: The neck has a normal contour; no masses are found on palpation    Previous:   Fiberoptic examination of the upper airway using scope was conducted after first applying topical phenylephrine (1%) and lidocaine (4%) to the bilateral nasal cavity by spray. The endoscope was inserted and advanced through the bilateral side of the nose examining the mucosa and nasal structures. Right:  Inferior turbinate normal, middle meatus clear, no polyps or purulence  Left:  Inferior turbinate normal, middle meatus clear, no polyps or purulence  Nasopharynx clear, ET patent, adenoid small. Septum to the right. Previous:   Fiberoptic examination of the upper airway using scope was conducted after first applying topical phenylephrine (1%) and lidocaine (4%) to the bilateral nasal cavity by spray. The endoscope was inserted and advanced through the bilateral side of the nose examining the mucosa and nasal structures. Advancement through the nasopharynx was continued into the hypopharynx and larynx. The tongue base, vallecula, pharyngeal walls, epiglottis aryepiglottic folds arytenoids, vocal cords, piriform sinuses and proximal trachea were examined. Findings include left medialized, immobile vocal cord, no pooled secretions. Mobility of the structures was symmetric and full. An electronic signature was used to authenticate this note.     --Sangeeta Carlson MD     5/20/2022 3:17 PM EST